# Patient Record
Sex: MALE | Race: BLACK OR AFRICAN AMERICAN | Employment: PART TIME | URBAN - METROPOLITAN AREA
[De-identification: names, ages, dates, MRNs, and addresses within clinical notes are randomized per-mention and may not be internally consistent; named-entity substitution may affect disease eponyms.]

---

## 2019-10-30 ENCOUNTER — HOSPITAL ENCOUNTER (EMERGENCY)
Age: 34
Discharge: HOME | End: 2019-10-30

## 2019-10-30 VITALS
DIASTOLIC BLOOD PRESSURE: 78 MMHG | RESPIRATION RATE: 15 BRPM | TEMPERATURE: 97.8 F | SYSTOLIC BLOOD PRESSURE: 135 MMHG | OXYGEN SATURATION: 99 % | HEART RATE: 88 BPM | WEIGHT: 168.43 LBS

## 2019-10-30 DIAGNOSIS — M54.41 ACUTE RIGHT-SIDED LOW BACK PAIN WITH RIGHT-SIDED SCIATICA: Primary | ICD-10-CM

## 2019-10-30 PROCEDURE — 6360000002 HC RX W HCPCS: Performed by: PHYSICIAN ASSISTANT

## 2019-10-30 PROCEDURE — 96372 THER/PROPH/DIAG INJ SC/IM: CPT

## 2019-10-30 PROCEDURE — 99283 EMERGENCY DEPT VISIT LOW MDM: CPT

## 2019-10-30 RX ORDER — KETOROLAC TROMETHAMINE 30 MG/ML
30 INJECTION, SOLUTION INTRAMUSCULAR; INTRAVENOUS ONCE
Status: COMPLETED | OUTPATIENT
Start: 2019-10-30 | End: 2019-10-30

## 2019-10-30 RX ORDER — LIDOCAINE 50 MG/G
1 PATCH TOPICAL DAILY
Qty: 10 PATCH | Refills: 0 | Status: SHIPPED | OUTPATIENT
Start: 2019-10-30

## 2019-10-30 RX ORDER — ACETAMINOPHEN 500 MG
1000 TABLET ORAL EVERY 6 HOURS PRN
Qty: 30 TABLET | Refills: 0 | Status: SHIPPED | OUTPATIENT
Start: 2019-10-30 | End: 2022-04-17

## 2019-10-30 RX ORDER — CYCLOBENZAPRINE HCL 10 MG
10 TABLET ORAL NIGHTLY PRN
Qty: 20 TABLET | Refills: 0 | Status: SHIPPED | OUTPATIENT
Start: 2019-10-30 | End: 2022-04-17

## 2019-10-30 RX ORDER — NAPROXEN 500 MG/1
500 TABLET ORAL 2 TIMES DAILY WITH MEALS
Qty: 20 TABLET | Refills: 0 | Status: SHIPPED | OUTPATIENT
Start: 2019-10-30 | End: 2022-04-17

## 2019-10-30 RX ADMIN — KETOROLAC TROMETHAMINE 30 MG: 30 INJECTION, SOLUTION INTRAMUSCULAR at 14:38

## 2019-10-30 ASSESSMENT — PAIN SCALES - GENERAL
PAINLEVEL_OUTOF10: 10
PAINLEVEL_OUTOF10: 10
PAINLEVEL_OUTOF10: 9

## 2019-10-30 ASSESSMENT — PAIN DESCRIPTION - LOCATION
LOCATION: BACK
LOCATION: BACK

## 2019-10-30 ASSESSMENT — PAIN DESCRIPTION - PAIN TYPE
TYPE: ACUTE PAIN
TYPE: ACUTE PAIN

## 2019-10-30 ASSESSMENT — PAIN - FUNCTIONAL ASSESSMENT
PAIN_FUNCTIONAL_ASSESSMENT: 0-10
PAIN_FUNCTIONAL_ASSESSMENT: PREVENTS OR INTERFERES SOME ACTIVE ACTIVITIES AND ADLS

## 2019-10-30 ASSESSMENT — PAIN DESCRIPTION - ONSET: ONSET: ON-GOING

## 2019-10-30 ASSESSMENT — PAIN DESCRIPTION - FREQUENCY: FREQUENCY: CONTINUOUS

## 2019-10-30 ASSESSMENT — PAIN DESCRIPTION - ORIENTATION: ORIENTATION: RIGHT

## 2019-10-30 ASSESSMENT — PAIN DESCRIPTION - DESCRIPTORS: DESCRIPTORS: SHARP;SHOOTING;ACHING

## 2019-10-30 ASSESSMENT — PAIN DESCRIPTION - PROGRESSION: CLINICAL_PROGRESSION: NOT CHANGED

## 2022-01-10 VITALS
WEIGHT: 174.6 LBS | TEMPERATURE: 97.7 F | HEIGHT: 69 IN | BODY MASS INDEX: 25.86 KG/M2 | OXYGEN SATURATION: 96 % | RESPIRATION RATE: 14 BRPM | SYSTOLIC BLOOD PRESSURE: 133 MMHG | DIASTOLIC BLOOD PRESSURE: 83 MMHG | HEART RATE: 76 BPM

## 2022-01-10 LAB
BASOPHILS ABSOLUTE: 0.1 K/UL (ref 0–0.2)
BASOPHILS RELATIVE PERCENT: 0.7 %
EOSINOPHILS ABSOLUTE: 0.2 K/UL (ref 0–0.6)
EOSINOPHILS RELATIVE PERCENT: 2.8 %
HCT VFR BLD CALC: 47.5 % (ref 40.5–52.5)
HEMOGLOBIN: 16 G/DL (ref 13.5–17.5)
LYMPHOCYTES ABSOLUTE: 3.1 K/UL (ref 1–5.1)
LYMPHOCYTES RELATIVE PERCENT: 39.1 %
MCH RBC QN AUTO: 28.6 PG (ref 26–34)
MCHC RBC AUTO-ENTMCNC: 33.7 G/DL (ref 31–36)
MCV RBC AUTO: 84.8 FL (ref 80–100)
MONOCYTES ABSOLUTE: 0.7 K/UL (ref 0–1.3)
MONOCYTES RELATIVE PERCENT: 8.5 %
NEUTROPHILS ABSOLUTE: 3.9 K/UL (ref 1.7–7.7)
NEUTROPHILS RELATIVE PERCENT: 48.9 %
PDW BLD-RTO: 14.7 % (ref 12.4–15.4)
PLATELET # BLD: 204 K/UL (ref 135–450)
PMV BLD AUTO: 8.1 FL (ref 5–10.5)
RBC # BLD: 5.59 M/UL (ref 4.2–5.9)
WBC # BLD: 7.9 K/UL (ref 4–11)

## 2022-01-10 PROCEDURE — 80048 BASIC METABOLIC PNL TOTAL CA: CPT

## 2022-01-10 PROCEDURE — 99282 EMERGENCY DEPT VISIT SF MDM: CPT

## 2022-01-10 PROCEDURE — 83690 ASSAY OF LIPASE: CPT

## 2022-01-10 PROCEDURE — 81003 URINALYSIS AUTO W/O SCOPE: CPT

## 2022-01-10 PROCEDURE — 85025 COMPLETE CBC W/AUTO DIFF WBC: CPT

## 2022-01-10 PROCEDURE — 36415 COLL VENOUS BLD VENIPUNCTURE: CPT

## 2022-01-10 ASSESSMENT — PAIN SCALES - GENERAL: PAINLEVEL_OUTOF10: 4

## 2022-01-10 ASSESSMENT — PAIN DESCRIPTION - ORIENTATION: ORIENTATION: MID

## 2022-01-10 ASSESSMENT — PAIN DESCRIPTION - LOCATION: LOCATION: ABDOMEN

## 2022-01-11 ENCOUNTER — HOSPITAL ENCOUNTER (EMERGENCY)
Age: 37
Discharge: HOME | End: 2022-01-11

## 2022-01-11 DIAGNOSIS — R11.2 NON-INTRACTABLE VOMITING WITH NAUSEA, UNSPECIFIED VOMITING TYPE: Primary | ICD-10-CM

## 2022-01-11 DIAGNOSIS — R10.13 EPIGASTRIC BURNING SENSATION: ICD-10-CM

## 2022-01-11 LAB
ANION GAP SERPL CALCULATED.3IONS-SCNC: 9 MMOL/L (ref 3–16)
BILIRUBIN URINE: NEGATIVE
BLOOD, URINE: NEGATIVE
BUN BLDV-MCNC: 15 MG/DL (ref 7–20)
CALCIUM SERPL-MCNC: 9.2 MG/DL (ref 8.3–10.6)
CHLORIDE BLD-SCNC: 102 MMOL/L (ref 99–110)
CLARITY: CLEAR
CO2: 27 MMOL/L (ref 21–32)
COLOR: YELLOW
CREAT SERPL-MCNC: 1.2 MG/DL (ref 0.9–1.3)
GFR AFRICAN AMERICAN: >60
GFR NON-AFRICAN AMERICAN: >60
GLUCOSE BLD-MCNC: 131 MG/DL (ref 70–99)
GLUCOSE URINE: NEGATIVE MG/DL
KETONES, URINE: NEGATIVE MG/DL
LEUKOCYTE ESTERASE, URINE: NEGATIVE
LIPASE: 20 U/L (ref 13–60)
MICROSCOPIC EXAMINATION: NORMAL
NITRITE, URINE: NEGATIVE
PH UA: 6 (ref 5–8)
POTASSIUM REFLEX MAGNESIUM: 3.8 MMOL/L (ref 3.5–5.1)
PROTEIN UA: NEGATIVE MG/DL
SODIUM BLD-SCNC: 138 MMOL/L (ref 136–145)
SPECIFIC GRAVITY UA: 1.01 (ref 1–1.03)
URINE REFLEX TO CULTURE: NORMAL
URINE TYPE: NORMAL
UROBILINOGEN, URINE: 0.2 E.U./DL

## 2022-01-11 RX ORDER — SUCRALFATE ORAL 1 G/10ML
1 SUSPENSION ORAL 4 TIMES DAILY
Qty: 1200 ML | Refills: 0 | Status: SHIPPED | OUTPATIENT
Start: 2022-01-11

## 2022-01-11 RX ORDER — DICYCLOMINE HYDROCHLORIDE 10 MG/1
10 CAPSULE ORAL EVERY 6 HOURS PRN
Qty: 20 CAPSULE | Refills: 0 | Status: SHIPPED | OUTPATIENT
Start: 2022-01-11

## 2022-01-11 RX ORDER — ONDANSETRON 4 MG/1
4-8 TABLET, ORALLY DISINTEGRATING ORAL EVERY 12 HOURS PRN
Qty: 20 TABLET | Refills: 0 | Status: SHIPPED | OUTPATIENT
Start: 2022-01-11

## 2022-01-11 RX ORDER — FAMOTIDINE 20 MG/1
20 TABLET, FILM COATED ORAL 2 TIMES DAILY
Qty: 60 TABLET | Refills: 0 | Status: SHIPPED | OUTPATIENT
Start: 2022-01-11

## 2022-01-11 ASSESSMENT — ENCOUNTER SYMPTOMS
NAUSEA: 1
VOMITING: 1
ABDOMINAL PAIN: 0
DIARRHEA: 0
COUGH: 0
EYE PAIN: 0
CHEST TIGHTNESS: 0
WHEEZING: 0
BACK PAIN: 0
BLOOD IN STOOL: 0
SORE THROAT: 0
SHORTNESS OF BREATH: 0
CONSTIPATION: 0

## 2022-01-11 NOTE — Clinical Note
Lencho Contreras was seen and treated in our emergency department on 1/10/2022. He may return to work on 01/13/2022. If you have any questions or concerns, please don't hesitate to call.       Guanako Robins, APRN - CNP

## 2022-01-11 NOTE — ED PROVIDER NOTES
Positive for nausea and vomiting. Negative for abdominal pain, blood in stool, constipation and diarrhea. Genitourinary: Negative for dysuria and hematuria. Musculoskeletal: Negative for back pain, myalgias and neck pain. Skin: Negative for rash and wound. Allergic/Immunologic: Negative for immunocompromised state. Neurological: Negative for dizziness and light-headedness. All other systems reviewed and are negative. Positives and Pertinent negatives as per HPI. Except as noted above in the ROS, all other systems were reviewed and negative. PAST MEDICAL HISTORY   No past medical history on file. SURGICAL HISTORY   No past surgical history on file. CURRENTMEDICATIONS       Previous Medications    ACETAMINOPHEN (APAP EXTRA STRENGTH) 500 MG TABLET    Take 2 tablets by mouth every 6 hours as needed for Pain DO NOT TAKE WITH OTHER MEDICATIONS CONTAINING ACETAMINOPHEN. CYCLOBENZAPRINE (FLEXERIL) 10 MG TABLET    Take 1 tablet by mouth nightly as needed for Muscle spasms    LIDOCAINE (LIDODERM) 5 %    Place 1 patch onto the skin daily 12 hours on, 12 hours off. **May substitute OTC patches if Rx not covered by insurance**    NAPROXEN (NAPROSYN) 500 MG TABLET    Take 1 tablet by mouth 2 times daily (with meals)         ALLERGIES     Patient has no known allergies. FAMILYHISTORY     No family history on file. SOCIAL HISTORY       Social History     Tobacco Use    Smoking status: Not on file    Smokeless tobacco: Not on file   Substance Use Topics    Alcohol use: Not on file    Drug use: Not on file       SCREENINGS             PHYSICAL EXAM    (up to 7 for level 4, 8 or more for level 5)     ED Triage Vitals [01/10/22 2244]   BP Temp Temp Source Pulse Resp SpO2 Height Weight   133/83 97.7 °F (36.5 °C) Oral 76 14 96 % 5' 9\" (1.753 m) 174 lb 9.7 oz (79.2 kg)       Physical Exam  Vitals and nursing note reviewed. Constitutional:       General: He is not in acute distress. Appearance: Normal appearance. He is not ill-appearing, toxic-appearing or diaphoretic. HENT:      Head: Normocephalic and atraumatic. No raccoon eyes, Byrd's sign, right periorbital erythema or left periorbital erythema. Right Ear: Hearing and external ear normal.      Left Ear: Hearing and external ear normal.      Nose: Nose normal. No laceration, nasal tenderness, mucosal edema, congestion or rhinorrhea. Right Nostril: No epistaxis. Left Nostril: No epistaxis. Mouth/Throat:      Lips: Pink. No lesions. Mouth: Mucous membranes are moist.      Tongue: No lesions. Tongue does not deviate from midline. Pharynx: Oropharynx is clear. Uvula midline. No pharyngeal swelling, oropharyngeal exudate, posterior oropharyngeal erythema or uvula swelling. Tonsils: No tonsillar exudate or tonsillar abscesses. Eyes:      General: Lids are normal.         Right eye: No discharge. Left eye: No discharge. Extraocular Movements: Extraocular movements intact. Neck:      Trachea: Phonation normal. No abnormal tracheal secretions or tracheal deviation. Comments: No meningismus   Cardiovascular:      Rate and Rhythm: Normal rate and regular rhythm. Pulses: Normal pulses. Heart sounds: Normal heart sounds. No murmur heard. No friction rub. No gallop. Pulmonary:      Effort: Pulmonary effort is normal. No respiratory distress. Breath sounds: Normal breath sounds. No stridor. No wheezing, rhonchi or rales. Abdominal:      General: Abdomen is flat. Bowel sounds are normal. There is no distension. Palpations: Abdomen is soft. Tenderness: There is no abdominal tenderness. There is no right CVA tenderness, left CVA tenderness, guarding or rebound. Negative signs include Rodriguez's sign, Rovsing's sign, McBurney's sign, psoas sign and obturator sign. Hernia: No hernia is present. Musculoskeletal:         General: Normal range of motion.       Cervical back: Full passive range of motion without pain, normal range of motion and neck supple. No rigidity. No spinous process tenderness or muscular tenderness. Lymphadenopathy:      Cervical: No cervical adenopathy. Skin:     General: Skin is warm and dry. Capillary Refill: Capillary refill takes less than 2 seconds. Neurological:      General: No focal deficit present. Mental Status: He is alert and oriented to person, place, and time. GCS: GCS eye subscore is 4. GCS verbal subscore is 5. GCS motor subscore is 6. Sensory: Sensation is intact. Motor: Motor function is intact. Gait: Gait is intact. Psychiatric:         Mood and Affect: Mood normal.         Behavior: Behavior normal.         DIAGNOSTIC RESULTS   LABS:    Labs Reviewed   BASIC METABOLIC PANEL W/ REFLEX TO MG FOR LOW K - Abnormal; Notable for the following components:       Result Value    Glucose 131 (*)     All other components within normal limits    Narrative:     Performed at:  [facility]  [address] [place], [place] [address]   Phone [phone]   URINE RT REFLEX TO CULTURE    Narrative:     Performed at:  [facility]  [address] [place], [place] [address]   Phone [phone]   CBC WITH AUTO DIFFERENTIAL    Narrative:     Performed at:  [facility]  [address] [place], [place] [address]   Phone [phone]   LIPASE    Narrative:     Performed at:  [facility]  [address] [place], [place] [address]   Phone [phone]       When ordered only abnormal lab results are displayed. All other labs were within normal range or not returned as of this dictation. EKG: When ordered, EKG's are interpreted by the Emergency Department Physician in the absence of a cardiologist.  Please see their note for interpretation of EKG.     RADIOLOGY:   Non-plain film images such as CT, Ultrasound and MRI are read by the radiologist. Plain radiographic images are visualized and preliminarily interpreted by the ED Provider with the below findings:        Interpretation per the Radiologist below, if available at the time of this note:    No orders to display     No results found. PROCEDURES   Unless otherwise noted below, none     Procedures    CRITICAL CARE TIME   N/A    CONSULTS:  None      EMERGENCY DEPARTMENT COURSE and DIFFERENTIAL DIAGNOSIS/MDM:   Vitals:    Vitals:    01/10/22 2244   BP: 133/83   Pulse: 76   Resp: 14   Temp: 97.7 °F (36.5 °C)   TempSrc: Oral   SpO2: 96%   Weight: 174 lb 9.7 oz (79.2 kg)   Height: 5' 9\" (1.753 m)       Patient was given the following medications:  Medications - No data to display        MDM: See HPI and above for full presentation and physical exam.  Differential diagnoses include dyspepsia, gastritis, gastroenteritis, NICK, electrolyte derangement, UTI, viral illness, other    Urinalysis grossly unremarkable. Blood work shows no leukocytosis or anemia. No significant electrolyte derangements or NICK. LFTs and lipase within normal limits. He is not having any abdominal pain, he is sitting there at the bedside, drinking water and able to tolerate p.o. intake. His abdomen is soft and nontender, and does not appear to be a surgical abdomen. I have a low suspicion of index for intra abdominal pathology. I did discuss CT imaging with him, through shared decision making between the patient and myself we are electing to not obtain imaging at this point in time, and he can always return for any new or worsening symptoms. He is in agreement with the plan of discharge with outpatient medications for his symptoms. He has remained afebrile and hemodynamically stable throughout his entire ED course. He is to return immediately, as stated above, for any new or worsening symptoms and we will give him an urgent no PCP referral for follow-up.     I estimate there is LOW risk for ACUTE APPENDICITIS, BOWEL OBSTRUCTION, CHOLECYSTITIS, DIVERTICULITIS, INCARCERATED HERNIA, PANCREATITIS, or PERFORATED BOWEL or ULCER, thus I consider the discharge disposition reasonable. Also, there is no evidence or peritonitis, sepsis, or toxicity. Ed Crawford and I have discussed the diagnosis and risks, and we agree with discharging home to follow-up with their primary doctor. We also discussed returning to the Emergency Department immediately if new or worsening symptoms occur. We have discussed the symptoms which are most concerning (e.g., bloody stool, fever, changing or worsening pain, vomiting) that necessitate immediate return. FINAL IMPRESSION      1. Non-intractable vomiting with nausea, unspecified vomiting type    2. Epigastric burning sensation          DISPOSITION/PLAN   DISPOSITION Decision To Discharge 01/11/2022 12:17:19 AM      PATIENT REFERRED TO:  Abdiaziz Bonmimi  [phone]  Schedule an appointment as soon as possible for a visit in 3 days      [facility] Emergency Department  [address] [address]  [phone]  Go to   As needed, If symptoms worsen      DISCHARGE MEDICATIONS:  New Prescriptions    DICYCLOMINE (BENTYL) 10 MG CAPSULE    Take 1 capsule by mouth every 6 hours as needed (cramps)    FAMOTIDINE (PEPCID) 20 MG TABLET    Take 1 tablet by mouth 2 times daily    ONDANSETRON (ZOFRAN ODT) 4 MG DISINTEGRATING TABLET    Take 1-2 tablets by mouth every 12 hours as needed for Nausea or Vomiting May Sub regular tablet (non-ODT) if insurance does not cover ODT.     SUCRALFATE (CARAFATE) 1 GM/10ML SUSPENSION    Take 10 mLs by mouth 4 times daily       DISCONTINUED MEDICATIONS:  Discontinued Medications    No medications on file              (Please note that portions of this note were completed with a voice recognition program.  Efforts were made to edit the dictations but occasionally words are mis-transcribed.)    KORY Carroll CNP (electronically signed)            KORY Carroll CNP  01/11/22 0025

## 2022-04-17 ENCOUNTER — HOSPITAL ENCOUNTER (EMERGENCY)
Age: 37
Discharge: HOME | End: 2022-04-17
Attending: EMERGENCY MEDICINE

## 2022-04-17 VITALS
RESPIRATION RATE: 14 BRPM | OXYGEN SATURATION: 98 % | HEART RATE: 79 BPM | WEIGHT: 169.97 LBS | DIASTOLIC BLOOD PRESSURE: 81 MMHG | HEIGHT: 69 IN | TEMPERATURE: 98.3 F | SYSTOLIC BLOOD PRESSURE: 132 MMHG | BODY MASS INDEX: 25.18 KG/M2

## 2022-04-17 DIAGNOSIS — S39.012A STRAIN OF LUMBAR REGION, INITIAL ENCOUNTER: Primary | ICD-10-CM

## 2022-04-17 LAB
BILIRUBIN URINE: NEGATIVE
BLOOD, URINE: NEGATIVE
CLARITY: CLEAR
COLOR: YELLOW
GLUCOSE URINE: NEGATIVE MG/DL
KETONES, URINE: NEGATIVE MG/DL
LEUKOCYTE ESTERASE, URINE: NEGATIVE
MICROSCOPIC EXAMINATION: NORMAL
NITRITE, URINE: NEGATIVE
PH UA: 6.5 (ref 5–8)
PROTEIN UA: NEGATIVE MG/DL
SPECIFIC GRAVITY UA: 1.02 (ref 1–1.03)
URINE REFLEX TO CULTURE: NORMAL
URINE TYPE: NORMAL
UROBILINOGEN, URINE: 1 E.U./DL

## 2022-04-17 PROCEDURE — 99283 EMERGENCY DEPT VISIT LOW MDM: CPT

## 2022-04-17 PROCEDURE — 6370000000 HC RX 637 (ALT 250 FOR IP): Performed by: EMERGENCY MEDICINE

## 2022-04-17 PROCEDURE — 81003 URINALYSIS AUTO W/O SCOPE: CPT

## 2022-04-17 RX ORDER — NAPROXEN 250 MG/1
500 TABLET ORAL ONCE
Status: COMPLETED | OUTPATIENT
Start: 2022-04-17 | End: 2022-04-17

## 2022-04-17 RX ORDER — CYCLOBENZAPRINE HCL 10 MG
10 TABLET ORAL 3 TIMES DAILY PRN
Qty: 21 TABLET | Refills: 0 | Status: SHIPPED | OUTPATIENT
Start: 2022-04-17 | End: 2022-04-27

## 2022-04-17 RX ORDER — NAPROXEN 500 MG/1
500 TABLET ORAL 2 TIMES DAILY WITH MEALS
Qty: 30 TABLET | Refills: 1 | Status: SHIPPED | OUTPATIENT
Start: 2022-04-17

## 2022-04-17 RX ORDER — ACETAMINOPHEN 500 MG
1000 TABLET ORAL EVERY 6 HOURS PRN
Qty: 40 TABLET | Refills: 0 | Status: SHIPPED | OUTPATIENT
Start: 2022-04-17

## 2022-04-17 RX ORDER — CYCLOBENZAPRINE HCL 10 MG
10 TABLET ORAL ONCE
Status: DISCONTINUED | OUTPATIENT
Start: 2022-04-17 | End: 2022-04-17 | Stop reason: HOSPADM

## 2022-04-17 RX ADMIN — NAPROXEN 500 MG: 250 TABLET ORAL at 02:57

## 2022-04-17 ASSESSMENT — PAIN DESCRIPTION - DESCRIPTORS: DESCRIPTORS: SHARP

## 2022-04-17 ASSESSMENT — PAIN DESCRIPTION - LOCATION: LOCATION: BACK

## 2022-04-17 ASSESSMENT — PAIN DESCRIPTION - ORIENTATION: ORIENTATION: RIGHT

## 2022-04-17 ASSESSMENT — PAIN DESCRIPTION - FREQUENCY: FREQUENCY: CONTINUOUS

## 2022-04-17 ASSESSMENT — PAIN SCALES - GENERAL
PAINLEVEL_OUTOF10: 7
PAINLEVEL_OUTOF10: 7

## 2022-04-17 NOTE — LETTER
[facility] Emergency Department  [address] [address]  Phone: [phone]               April 17, 2022    Patient: Jeannine Ngo   YOB: 1985   Date of Visit: 4/17/2022       To Whom It May Concern:    Jeannine Ngo was seen and treated in our emergency department on 4/17/2022. He {Return to school/sport/work:68362}.       Sincerely,       Peter Ho RN         Signature:__________________________________

## 2022-04-17 NOTE — LETTER
[facility] Emergency Department  [address] [address]  Phone: [phone]               April 17, 2022    Patient: Starr Denise   YOB: 1985   Date of Visit: 4/17/2022       To Whom It May Concern:    Starr Denise was seen and treated in our emergency department on 4/17/2022. He may return to work on 04/19/2022.       Sincerely,       Geeta Erickson RN         Signature:__________________________________

## 2022-04-17 NOTE — ED PROVIDER NOTES
CHIEF COMPLAINT  Back Pain      HISTORY OF PRESENT ILLNESS  Misael Toribio is a 40 y.o. male who presents to the ED complaining of low back pain that started after he slipped and fell down some steps. Patient states he landed on his bottom and slid down a few steps. Denies any head trauma or loss of consciousness. No neck pain. No numbness or weakness. Denies any bowel or bladder incontinence. No difficulty ambulating. States pain is a an aching sensation that gets worse with movement and better with resting. Denies taking medications for pain control prior to coming to the emergency room. No other complaints, modifying factors or associated symptoms. Nursing notes reviewed. Denies any past medical history  Denies any past surgical history  Denies any pertinent family history  Social History     Socioeconomic History    Marital status:      Spouse name: Not on file    Number of children: Not on file    Years of education: Not on file    Highest education level: Not on file   Occupational History    Not on file   Tobacco Use    Smoking status: Not on file    Smokeless tobacco: Not on file   Substance and Sexual Activity    Alcohol use: Not on file    Drug use: Not on file    Sexual activity: Not on file   Other Topics Concern    Not on file   Social History Narrative    Not on file     Social Determinants of Health     Financial Resource Strain:     Difficulty of Paying Living Expenses: Not on file   Food Insecurity:     Worried About Running Out of Food in the Last Year: Not on file    Joao of Food in the Last Year: Not on file   Transportation Needs:     Lack of Transportation (Medical): Not on file    Lack of Transportation (Non-Medical):  Not on file   Physical Activity:     Days of Exercise per Week: Not on file    Minutes of Exercise per Session: Not on file   Stress:     Feeling of Stress : Not on file   Social Connections:     Frequency of Communication with Friends and Family: Not on file    Frequency of Social Gatherings with Friends and Family: Not on file    Attends Anabaptist Services: Not on file    Active Member of Clubs or Organizations: Not on file    Attends Club or Organization Meetings: Not on file    Marital Status: Not on file   Intimate Partner Violence:     Fear of Current or Ex-Partner: Not on file    Emotionally Abused: Not on file    Physically Abused: Not on file    Sexually Abused: Not on file   Housing Stability:     Unable to Pay for Housing in the Last Year: Not on file    Number of [place] in the Last Year: Not on file    Unstable Housing in the Last Year: Not on file     No current facility-administered medications for this encounter. Current Outpatient Medications   Medication Sig Dispense Refill    naproxen (NAPROSYN) 500 MG tablet Take 1 tablet by mouth 2 times daily (with meals) 30 tablet 1    acetaminophen (TYLENOL) 500 MG tablet Take 2 tablets by mouth every 6 hours as needed for Pain 40 tablet 0    cyclobenzaprine (FLEXERIL) 10 MG tablet Take 1 tablet by mouth 3 times daily as needed for Muscle spasms 21 tablet 0    ondansetron (ZOFRAN ODT) 4 MG disintegrating tablet Take 1-2 tablets by mouth every 12 hours as needed for Nausea or Vomiting May Sub regular tablet (non-ODT) if insurance does not cover ODT. 20 tablet 0    sucralfate (CARAFATE) 1 GM/10ML suspension Take 10 mLs by mouth 4 times daily 1200 mL 0    famotidine (PEPCID) 20 MG tablet Take 1 tablet by mouth 2 times daily 60 tablet 0    dicyclomine (BENTYL) 10 MG capsule Take 1 capsule by mouth every 6 hours as needed (cramps) 20 capsule 0    lidocaine (LIDODERM) 5 % Place 1 patch onto the skin daily 12 hours on, 12 hours off.   **May substitute OTC patches if Rx not covered by insurance** 10 patch 0     No Known Allergies      REVIEW OF SYSTEMS  10 systems reviewed, pertinent positives per HPI otherwise noted to be negative    PHYSICAL EXAM  /81 Pulse 79   Temp 98.3 °F (36.8 °C)   Resp 14   Ht 5' 9\" (1.753 m)   Wt 169 lb 15.6 oz (77.1 kg)   SpO2 98%   BMI 25.10 kg/m²      CONSTITUTIONAL: AOx4, cooperative with exam, afebrile   HEAD: normocephalic, atraumatic   EYES: PERRL, EOMI, anicteric sclera   ENT: Moist mucous membranes, uvula midline, no hemotympanum or signs of basilar skull fracture   BACK: Symmetric, no deformity, paraspinal tenderness lumbar region bilaterally   LUNGS: Bilateral breath sounds, CTAB, no rales/ronchi/wheezes   CARDIOVASCULAR: RRR, normal S1/S2, no m/r/g, 2+ pulses throughout   ABDOMEN: Soft, non-tender, non-distended, +BS   NEUROLOGIC:  MAEx4, 5/5 strength throughout; fine touch sensation intact throughout; normal gait; normal sensation medial thighs, normal dorsiflexion of the feet, normal flexion and extension strength at the knees   MUSCULOSKELETAL: No clubbing, cyanosis or edema   SKIN: No rash, pallor or wounds on exposed surfaces         RADIOLOGY  X-RAYS:  I have reviewed radiologic plain film image(s). ALL OTHER NON-PLAIN FILM IMAGES SUCH AS CT, ULTRASOUND AND MRI HAVE BEEN READ BY THE RADIOLOGIST. No orders to display          EKG INTERPRETATION  None    PROCEDURES    ED COURSE/MDM  Lumbar strain, contusion  Patient seen and evaluated. History and physical as above. Nontoxic, afebrile. Patient presents for lower back pain after he fell down some steps. Patient without any midline tenderness of the cervical, thoracic or lumbar spine. Does have paraspinal tenderness in lumbar region bilaterally. Normal distal perfusion. No acute findings of high-sensitivity neuro exam suggest cauda equina, epidural abscess, epidural hematoma or cord compression. Plan for naproxen and Flexeril with outpatient follow-up. Patient agreeable. First dose is given in the ED. Return instruction provided. All questions answered prior to discharge. PCP referral provided at discharge.      I estimate there is LOW risk for ABDOMINAL AORTIC ANEURYSM, CAUDA EQUINA SYNDROME, EPIDURAL MASS LESION, SPINAL STENOSIS, OR HERNIATED DISK CAUSING SEVERE STENOSIS, thus I consider the discharge disposition reasonable. Garrett Drew and I have discussed the diagnosis and risks, and we agree with discharging home to follow-up with their primary doctor. We also discussed returning to the Emergency Department immediately if new or worsening symptoms occur. We have discussed the symptoms which are most concerning (e.g., saddle anesthesia, urinary or bowel incontinence or retention, changing or worsening pain) that necessitate immediate return. Patient was given scripts for the following medications. I counseled patient how to take these medications. Discharge Medication List as of 4/17/2022  3:05 AM              CLINICAL IMPRESSION  1. Strain of lumbar region, initial encounter        Blood pressure 132/81, pulse 79, temperature 98.3 °F (36.8 °C), resp. rate 14, height 5' 9\" (1.753 m), weight 169 lb 15.6 oz (77.1 kg), SpO2 98 %. DISPOSITION  Patient was discharged to home in good condition. [facility]  [phone]  Call in 1 day  For a follow up appointment. Disclaimer: All medical record entries made by [employer] dictation.       (Please note that this note was completed with a voice recognition program. Every attempt was made to edit the dictations, but inevitably there remain words that are mis-transcribed.)            Celi Sarmiento MD  04/18/22 0008

## 2022-04-17 NOTE — ED TRIAGE NOTES
Patient states he fell down six steps tonight hitting his back on the steps around 5 pm. Low back pain

## 2023-02-14 ENCOUNTER — HOSPITAL ENCOUNTER (EMERGENCY)
Age: 38
Discharge: HOME | End: 2023-02-15

## 2023-02-14 DIAGNOSIS — M62.838 SPASM OF MUSCLE: ICD-10-CM

## 2023-02-14 DIAGNOSIS — S39.012A STRAIN OF LUMBAR REGION, INITIAL ENCOUNTER: Primary | ICD-10-CM

## 2023-02-14 PROCEDURE — 99283 EMERGENCY DEPT VISIT LOW MDM: CPT

## 2023-02-14 RX ORDER — METHOCARBAMOL 500 MG/1
1000 TABLET, FILM COATED ORAL ONCE
Status: COMPLETED | OUTPATIENT
Start: 2023-02-15 | End: 2023-02-15

## 2023-02-14 RX ORDER — ACETAMINOPHEN 325 MG/1
650 TABLET ORAL ONCE
Status: COMPLETED | OUTPATIENT
Start: 2023-02-15 | End: 2023-02-15

## 2023-02-14 RX ORDER — METHOCARBAMOL 500 MG/1
500 TABLET, FILM COATED ORAL 4 TIMES DAILY
Qty: 40 TABLET | Refills: 0 | Status: SHIPPED | OUTPATIENT
Start: 2023-02-14 | End: 2023-02-24

## 2023-02-14 RX ORDER — LIDOCAINE 50 MG/G
1 PATCH TOPICAL DAILY
Qty: 15 PATCH | Refills: 0 | Status: SHIPPED | OUTPATIENT
Start: 2023-02-14

## 2023-02-14 RX ORDER — ACETAMINOPHEN 325 MG/1
650 TABLET ORAL EVERY 6 HOURS PRN
Qty: 60 TABLET | Refills: 0 | Status: SHIPPED | OUTPATIENT
Start: 2023-02-14

## 2023-02-14 RX ORDER — IBUPROFEN 800 MG/1
800 TABLET ORAL EVERY 8 HOURS PRN
Qty: 20 TABLET | Refills: 0 | Status: SHIPPED | OUTPATIENT
Start: 2023-02-14

## 2023-02-14 RX ORDER — IBUPROFEN 400 MG/1
800 TABLET ORAL ONCE
Status: COMPLETED | OUTPATIENT
Start: 2023-02-15 | End: 2023-02-15

## 2023-02-14 RX ORDER — LIDOCAINE 4 G/G
1 PATCH TOPICAL ONCE
Status: DISCONTINUED | OUTPATIENT
Start: 2023-02-15 | End: 2023-02-15 | Stop reason: HOSPADM

## 2023-02-14 ASSESSMENT — PAIN DESCRIPTION - FREQUENCY: FREQUENCY: CONTINUOUS

## 2023-02-14 ASSESSMENT — PAIN DESCRIPTION - PAIN TYPE: TYPE: ACUTE PAIN

## 2023-02-14 ASSESSMENT — PAIN DESCRIPTION - LOCATION: LOCATION: BACK

## 2023-02-14 ASSESSMENT — PAIN SCALES - GENERAL: PAINLEVEL_OUTOF10: 6

## 2023-02-14 ASSESSMENT — PAIN - FUNCTIONAL ASSESSMENT: PAIN_FUNCTIONAL_ASSESSMENT: 0-10

## 2023-02-14 NOTE — Clinical Note
Kim Israel was seen and treated in our emergency department on 2/14/2023. He may return to work on 02/17/2023. Upon return, light Duty x 1 week. No lifting/pushing/pulling anything greater than 10lbs. If you have any questions or concerns, please don't hesitate to call.       Antonia Meredith

## 2023-02-15 VITALS
BODY MASS INDEX: 24.49 KG/M2 | HEART RATE: 71 BPM | RESPIRATION RATE: 17 BRPM | TEMPERATURE: 97.5 F | WEIGHT: 165.34 LBS | OXYGEN SATURATION: 98 % | SYSTOLIC BLOOD PRESSURE: 109 MMHG | DIASTOLIC BLOOD PRESSURE: 80 MMHG | HEIGHT: 69 IN

## 2023-02-15 PROCEDURE — 6370000000 HC RX 637 (ALT 250 FOR IP): Performed by: PHYSICIAN ASSISTANT

## 2023-02-15 RX ADMIN — METHOCARBAMOL 1000 MG: 500 TABLET ORAL at 00:07

## 2023-02-15 RX ADMIN — ACETAMINOPHEN 650 MG: 325 TABLET ORAL at 00:07

## 2023-02-15 RX ADMIN — IBUPROFEN 800 MG: 400 TABLET ORAL at 00:07

## 2023-02-15 ASSESSMENT — PAIN SCALES - GENERAL: PAINLEVEL_OUTOF10: 7

## 2023-02-15 ASSESSMENT — PAIN DESCRIPTION - LOCATION: LOCATION: BACK

## 2023-02-15 ASSESSMENT — PAIN DESCRIPTION - ORIENTATION: ORIENTATION: LOWER

## 2023-02-15 NOTE — DISCHARGE INSTRUCTIONS
- Use caution when taking robaxin as it may cause drowsiness. Do not drive or operate machinery within 8 hours of taking this medication. Do not watch small children while taking this medication. Do not take with alcohol.   - Do not take any over the counter NSAID's (anti-inflammatories, such as Motrin, Advil, Ibuprofen, Aleve, Naprosyn, Aspirin, etc.) while taking the medication you were prescribed today.    - Practice stretches 2-3 times daily  - Use ice for the first 2-3 days, then use heat  - Avoid heavy lifting for 3-5 days  - Follow up with primary care in 2-3 days   - Return with any new or worrisome symptoms

## 2023-02-15 NOTE — ED TRIAGE NOTES
Pt arrives ambulatory for eval of back pain onset Thursday after heavy lifting. Pt is a/ox4, rsp nonlabored and pwd.

## 2023-02-15 NOTE — ED PROVIDER NOTES
[address]  [address] [address]  Dept: [phone]  Loc: [address] ENCOUNTER        This patient was not seen or evaluated by the attending physician. I evaluated this patient, the attending physician was available for consultation. CHIEF COMPLAINT    Chief Complaint   Patient presents with    Back Pain     Pt arrives ambulatory for eval of back pain onset Thursday after heavy lifting       HPI    Keli Desai is a 45 y.o. male who presents with back pain, localized in the right lumbar region of the back. The onset was [place]. The duration has been intermittent since the onset. The quality of the pain is sharp. The pain worsens with movement. The context is that it happened after he spent a shift lifting heavy items. He has tried motrin 800 mg which did not totally improve his symptoms. REVIEW OF SYSTEMS    General: No fevers or chills  GI: No abdominal pain or vomiting  : No dysuria or hematuria  Musculoskeletal: see HPI  Neurologic: No bowel or bladder incontinence, No saddle anesthesia, No leg weakness  All other systems reviewed and are negative. PAST MEDICAL & SURGICAL HISTORY    History reviewed. No pertinent past medical history. History reviewed. No pertinent surgical history.     CURRENT MEDICATIONS  (may include discharge medications prescribed in the ED)  Current Outpatient Rx   Medication Sig Dispense Refill    methocarbamol (ROBAXIN) 500 MG tablet Take 1 tablet by mouth 4 times daily for 10 days 40 tablet 0    lidocaine (LIDODERM) 5 % Place 1 patch onto the skin daily 12 hours on, 12 hours off. *If these are not covered, may substitute for 4% patches or OTC* 15 patch 0    acetaminophen (AMINOFEN) 325 MG tablet Take 2 tablets by mouth every 6 hours as needed for Pain 60 tablet 0    ibuprofen (ADVIL;MOTRIN) 800 MG tablet Take 1 tablet by mouth every 8 hours as needed for Pain or Fever 20 tablet 0    ondansetron (ZOFRAN ODT) 4 MG disintegrating tablet Take 1-2 tablets by mouth every 12 hours as needed for Nausea or Vomiting May Sub regular tablet (non-ODT) if insurance does not cover ODT.  20 tablet 0    sucralfate (CARAFATE) 1 GM/10ML suspension Take 10 mLs by mouth 4 times daily 1200 mL 0    famotidine (PEPCID) 20 MG tablet Take 1 tablet by mouth 2 times daily 60 tablet 0       ALLERGIES    No Known Allergies    SOCIAL HISTORY    Social History     Socioeconomic History    Marital status:      Spouse name: None    Number of children: None    Years of education: None    Highest education level: None   Tobacco Use    Smoking status: Every Day     Types: Cigarettes    Smokeless tobacco: Never   Vaping Use    Vaping Use: Never used   Substance and Sexual Activity    Alcohol use: Not Currently    Drug use: Not Currently       PHYSICAL EXAM    VITAL SIGNS: /71   Pulse 79   Temp 97.5 °F (36.4 °C) (Oral)   Resp 16   Ht 5' 9\" (1.753 m)   Wt 165 lb 5.5 oz (75 kg)   SpO2 98%   BMI 24.42 kg/m²    Constitutional:  Well developed, well nourished, no acute distress  HENT:  Atraumatic, moist mucus membranes  Neck: No JVD, supple   Respiratory:  No respiratory distress, normal breath sounds  Cardiovascular:  regular rate, no murmurs  GI:  Soft, nontender, no pulsatile masses or bruits of the abdomen  Musculoskeletal:  No edema, no acute deformities    Back: + right lumbar paraspinous tenderness to palpation, no bony midline tenderness, negative straight leg raise test bilaterally, no CVA tenderness  Integument:  Well hydrated, no rash  Vascular: Dorsalis pedis pulses are 2+ and equal bilaterally  Neurologic:  Motor testing reveals: intact thigh adduction, knee flexion and extension, ankle dorsiflexion, toe plantarflexion, and great toe dorsiflexion bilaterally, achilles and patellar reflexes are 2+ and equal bilaterally, sensation to light touch is intact in the groin and lower extremities bilaterally    RADIOLOGY  No orders to display       ED COURSE & MEDICAL DECISION MAKING    Please see EMR for medications administered in the ED. Differential Diagnosis: Cauda Equina Syndrome, Spinal Cord Compression, Conus Medullaris Syndrome, ruptured/dissecting Abdominal Aortic Aneurysm, Fracture or dislocation, other    Patient is afebrile and nontoxic in appearance. No evidence of neurological deficit on exam.    No history of significant trauma, plain films not indicated. Due to the absence of neurological deficit, I have low suspicion at this time for epidural compression syndrome. Patient's pain is most likely muscle spasm. I believe the patient is safe for discharge at this time. I'll prescribe symptomatic medications. The patient was advised to use caution when taking robaxin as it may cause drowsiness. They were advised not to drive or operate machinery within 8 hours of taking this medication. They were instructed not to take the medication with alcohol. The patient was advised to not take any over the counter NSAID's (anti-inflammatories, such as Motrin, Advil, Ibuprofen, Aleve, Naprosyn, Aspirin, etc.) while taking the medication they were prescribed today. He was provided with stretches to perform 2-3 times daily. He was given a work note. Strict return precautions given. The patient was instructed to follow up as an outpatient in 2 days with primary care. The patient was instructed to return to the ED immediately for any new or worsening symptoms, including bowel or bladder incontinence, saddle anesthesia or leg weakness. The patient verbalized understanding. FINAL IMPRESSION    1. Strain of lumbar region, initial encounter    2.  Spasm of muscle        PLAN  Discharge with outpatient follow-up (see EMR)      (Please note that this note was completed with a voice recognition program.  Every attempt was made to edit the dictations, but inevitably there remain words that are mis-transcribed.Alex Briones [place]  02/14/23 [id number]